# Patient Record
Sex: FEMALE | Race: WHITE | URBAN - METROPOLITAN AREA
[De-identification: names, ages, dates, MRNs, and addresses within clinical notes are randomized per-mention and may not be internally consistent; named-entity substitution may affect disease eponyms.]

---

## 2023-06-15 ENCOUNTER — OFFICE VISIT (OUTPATIENT)
Dept: URBAN - METROPOLITAN AREA CLINIC 71 | Facility: CLINIC | Age: 53
End: 2023-06-15
Payer: OTHER GOVERNMENT

## 2023-06-15 DIAGNOSIS — H04.123 DRY EYE SYNDROME OF BILATERAL LACRIMAL GLANDS: Primary | ICD-10-CM

## 2023-06-15 DIAGNOSIS — H02.052 TRICHIASIS WITHOUT ENTROPIAN RIGHT LOWER EYELID: ICD-10-CM

## 2023-06-15 DIAGNOSIS — G24.5 BLEPHAROSPASM: ICD-10-CM

## 2023-06-15 PROCEDURE — 92002 INTRM OPH EXAM NEW PATIENT: CPT | Performed by: OPTOMETRIST

## 2023-06-15 ASSESSMENT — INTRAOCULAR PRESSURE
OD: 12
OS: 8

## 2023-06-15 NOTE — IMPRESSION/PLAN
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 x1 Plan: Jeweler's forceps used to successfully remove lash in office today. Pt to call if symptoms return.

## 2023-06-15 NOTE — IMPRESSION/PLAN
Impression: Blepharospasm: G24.5. Since reconstructive surgery after having cancer. Pt does occasionally receive Botox injections for her blepharospasms. Plan: Discussed with pt. Continue monitoring and injections as advised.

## 2023-06-15 NOTE — IMPRESSION/PLAN
Impression: Dry eye syndrome of bilateral lacrimal glands: H04.123. OD>OS. Symptoms worse in AM and bright lights. Plan: Discussed dry eye disease. Use artificial tears up to QID. Recommend using a thicker lubricant before bed. PM drop handout given to pt today. Blink more frequently and thoroughly, especially during extended near/computer work. Call if worsens or no improvement.